# Patient Record
(demographics unavailable — no encounter records)

---

## 2024-11-13 NOTE — ASSESSMENT
[FreeTextEntry1] : 62 yo female presenting today with right anterior and lateral ankle joint line pain consistent with ATFL sprains. X-rays revealing non-displaced dorsal talar head avulsion. Recommend non-surgical management. -RLE WBAT in lace up ankle support brace, rx given today -Rx PT given today -Avoid strenuous/impact related activities -Discussed with patient that as this is her first ankle sprain, she has a 10% risk of developing ankle instability which may require surgery, understanding expressed -Rest, ice, compression, elevation, NSAIDs PRN for pain.  -All questions answered -F/u 8 weeks  The diagnosis was explained in detail. The potential non-surgical and surgical treatments were reviewed. The relative risks and benefits of each option were considered relative to the patients age, activity level, medical history, symptom severity and previously attempted treatments.  The patient was advised to consult with their primary medical provider prior to initiation of any new medications to reduce the risk of adverse effects specific to their long-term home medications and medical history. The risk of gastrointestinal irritation and kidney injury specific to long-term NSAID use was discussed.  Entered by Delvin Madsen PA-C acting as scribe. Dr. Barrios Attestation The documentation recorded by the scribe, in my presence, accurately reflects the service I, Dr. Barrios, personally performed, and the decisions made by me with my edits as appropriate.

## 2024-11-13 NOTE — PHYSICAL EXAM
[de-identified] : Examination of the right foot and ankle is as follows: INSPECTION: swelling, ecchymosis of foot and ankle, moderate swelling of dorsal foot and lateral ankle, but no abrasion, laceration, no erythema PALPATION: anterior ankle joint line tenderness, mild lateral ligament tenderness ROM: plantarflexion 20 degrees, inversion 15 degrees, eversion 10 degrees, dorsiflexion 10 degrees STRENGTH: dorsiflexion 4/5, plantar flexion 4/5, inversion 4/5, eversion 4/5, EHL 5/5, FHL 5/5 VASCULAR: dorsalis pedis pulse: 2+, posterior tibialis pulse: 2+ NEURO: Sensation present to light touch in all distributions GAIT: mildly antalgic, ambulation without assisted devices   X-rays of the right ankle is as follows: Ankle 3 view AP/Lateral/Oblique: Closed, non-displaced dorsal talar head avulsion. No fractures, subluxations or dislocations. No major abnormalities.

## 2024-11-13 NOTE — HISTORY OF PRESENT ILLNESS
[Gradual] : gradual [Dull/Aching] : dull/aching [Rest] : rest [Meds] : meds [Full time] : Work status: full time [Sudden] : sudden [8] : 8 [5] : 5 [Intermittent] : intermittent [de-identified] : Patient is here today for her right ankle. Pain began on 11/2/24. Patient states that she stepped on the edge of the pool cover and twisted her right ankle. Patient noting intermittent dull aching pain over anterior, medial and lateral aspects of her ankle. Patient states that she was taking Aleve initially but not taking medications for the pain now.         [] : Post Surgical Visit: no [FreeTextEntry1] : Right ankle [FreeTextEntry3] : 11/2/24 [FreeTextEntry5] : Patient states that she stepped on the edge of the pool cover and twisted her right ankle.  [de-identified] : movement [de-identified] :

## 2025-01-08 NOTE — ASSESSMENT
[FreeTextEntry1] : 62 yo female presenting today for f/u right anterior and lateral ankle joint line pain consistent with ATFL sprain. Patient has no pain or complaints today. Recommend non-surgical management. -RLE WBAT, d/c lace up ankle support brace -Activities as tolerated, no restrictions -Rest, ice, compression, elevation, NSAIDs PRN for pain.  -All questions answered -F/u PRN  The diagnosis was explained in detail. The potential non-surgical and surgical treatments were reviewed. The relative risks and benefits of each option were considered relative to the patients age, activity level, medical history, symptom severity and previously attempted treatments.  The patient was advised to consult with their primary medical provider prior to initiation of any new medications to reduce the risk of adverse effects specific to their long-term home medications and medical history. The risk of gastrointestinal irritation and kidney injury specific to long-term NSAID use was discussed.  Entered by Delvin Madsen PA-C acting as scribe. Dr. Barrios Attestation The documentation recorded by the scribe, in my presence, accurately reflects the service I, Dr. Barrios, personally performed, and the decisions made by me with my edits as appropriate.

## 2025-01-08 NOTE — PHYSICAL EXAM
[de-identified] : Examination of the right foot and ankle is as follows: INSPECTION: no swelling of dorsal foot and lateral ankle, but no abrasion, laceration, no erythema, no ecchymosis PALPATION: no lateral ankle ttp, no anterior ankle joint line tenderness ROM: plantarflexion 40 degrees, inversion 30 degrees, eversion 20 degrees, dorsiflexion 20 degrees STRENGTH: dorsiflexion 5/5, plantar flexion 5/5, inversion 5/5, eversion 5/5, EHL 5/5, FHL 5/5 VASCULAR: dorsalis pedis pulse: 2+, posterior tibialis pulse: 2+ NEURO: Sensation present to light touch in all distributions GAIT: non-antalgic, ambulation without assisted devices   X-rays of the right ankle is as follows: Ankle 3 view AP/Lateral/Oblique: Closed, non-displaced dorsal talar head avulsion. No fractures, subluxations or dislocations. No major abnormalities.

## 2025-01-08 NOTE — HISTORY OF PRESENT ILLNESS
[Gradual] : gradual [Sudden] : sudden [0] : 0 [Rest] : rest [Full time] : Work status: full time [de-identified] : Patient is here today for her right ankle. Patient being treated for ATFL sprains. Patient states she went to PT for 2x a week. Patient states she has no pain and no complaints at this time. Patient came into office ambulating on her own in sneakers.         [] : Post Surgical Visit: no [FreeTextEntry1] : Right ankle [FreeTextEntry3] : 11/2/24 [FreeTextEntry5] : Patient states that she stepped on the edge of the pool cover and twisted her right ankle.  [de-identified] :

## 2025-07-07 NOTE — DISCUSSION/SUMMARY
[de-identified] : Discussed the nature of the diagnosis and risk and benefits of different modalities of treatment. B/l CTS She will splint at night  Rx provided RTO 4 weeks

## 2025-07-07 NOTE — WORK
[Sprain/Strain] : sprain/strain [Was the competent medical cause of the injury] : was the competent medical cause of the injury [Are consistent with the injury] : are consistent with the injury [Consistent with my objective findings] : consistent with my objective findings [Partial] : partial [Can return to work without limitations on ______] : can return to work without limitations on [unfilled] [No Rx restrictions] : No Rx restrictions. [I provided the services listed above] :  I provided the services listed above.

## 2025-07-07 NOTE — HISTORY OF PRESENT ILLNESS
[Work related] : work related [Gradual] : gradual [5] : 5 [6] : 6 [Dull/Aching] : dull/aching [Localized] : localized [Sharp] : sharp [Constant] : constant [Full time] : Work status: full time [de-identified] : 62 year old female presents b/l wrist pain after doing heavy lifting. She reports pain was immediate. She reports numbness in the radial 3 fingers. She is not awoken from sleep. She has a history of some numbness prior to this incident but this is a clear exacerbation of the previous numbness.   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 pain has been decreasing since DOI.   [FreeTextEntry6] : weakness  [FreeTextEntry7] : stiffness in right pinky and ring, left finger numbness/ tingling  [FreeTextEntry9] : aleve, tiger balm  [de-identified] : ADL, Using  [de-identified] : none [de-identified] :

## 2025-07-28 NOTE — DISCUSSION/SUMMARY
[de-identified] : Discussed the nature of the diagnosis and risk and benefits of different modalities of treatment. B/l CTS She will splint at night for 4 weeks, then d/c splinting All questions answered RTO 5 weeks

## 2025-07-28 NOTE — HISTORY OF PRESENT ILLNESS
[de-identified] : 62 year old female followed for bilateral CTS, which started after some heavy lifting at work.   She has bene wearing splints at night and reports improvement in her symptoms. 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 pain has been decreasing since DOI.   [FreeTextEntry6] : weakness  [FreeTextEntry7] : Rt thumb, let middle finger [de-identified] : closing the hand [de-identified] : none [de-identified] :